# Patient Record
Sex: MALE | Race: BLACK OR AFRICAN AMERICAN | Employment: FULL TIME | URBAN - METROPOLITAN AREA
[De-identification: names, ages, dates, MRNs, and addresses within clinical notes are randomized per-mention and may not be internally consistent; named-entity substitution may affect disease eponyms.]

---

## 2020-07-05 ENCOUNTER — OFFICE VISIT (OUTPATIENT)
Dept: URGENT CARE | Facility: URGENT CARE | Age: 60
End: 2020-07-05
Payer: COMMERCIAL

## 2020-07-05 VITALS
HEART RATE: 66 BPM | SYSTOLIC BLOOD PRESSURE: 134 MMHG | WEIGHT: 310.44 LBS | OXYGEN SATURATION: 96 % | DIASTOLIC BLOOD PRESSURE: 85 MMHG | TEMPERATURE: 98.3 F

## 2020-07-05 DIAGNOSIS — K08.89 TOOTH PAIN: Primary | ICD-10-CM

## 2020-07-05 DIAGNOSIS — Z20.822 EXPOSURE TO COVID-19 VIRUS: ICD-10-CM

## 2020-07-05 PROCEDURE — 99203 OFFICE O/P NEW LOW 30 MIN: CPT | Performed by: FAMILY MEDICINE

## 2020-07-05 RX ORDER — AMOXICILLIN 875 MG
TABLET ORAL
COMMUNITY
Start: 2020-03-30

## 2020-07-05 RX ORDER — AMOXICILLIN 875 MG
875 TABLET ORAL 2 TIMES DAILY
Qty: 20 TABLET | Refills: 0 | Status: SHIPPED | OUTPATIENT
Start: 2020-07-05 | End: 2020-07-15

## 2020-07-05 RX ORDER — IBUPROFEN 800 MG/1
TABLET, FILM COATED ORAL
COMMUNITY
Start: 2020-03-30

## 2020-07-05 RX ORDER — LISINOPRIL AND HYDROCHLOROTHIAZIDE 12.5; 2 MG/1; MG/1
1 TABLET ORAL
COMMUNITY
Start: 2019-07-24 | End: 2020-07-23

## 2020-07-05 RX ORDER — SILDENAFIL 50 MG/1
50 TABLET, FILM COATED ORAL
COMMUNITY
Start: 2019-07-24

## 2020-07-05 NOTE — PROGRESS NOTES
Subjective     Stanley Jones is a 59 year old male who presents to clinic today for the following health issues:    HPI   History of tooth pain, right lower area being swollen.  Patient reports previously was supposed to have some type of surgery however been postponed because of the current acute infection.    He reports his wife was exposed at work for COVID infection 1 of her coworker was tested positive.  Otherwise she has no symptoms.  And he denies any other symptoms.      Reviewed and updated as needed this visit by Provider         Review of Systems   Constitutional, HEENT, cardiovascular, pulmonary, gi and gu systems are negative, except as otherwise noted.      Objective    /85   Pulse 66   Temp 98.3  F (36.8  C)   Wt 140.8 kg (310 lb 7 oz)   SpO2 96%   There is no height or weight on file to calculate BMI.  Physical Exam   GENERAL: healthy, alert and no distress  MS: no gross musculoskeletal defects noted, no edema    Diagnostic Test Results:  Labs reviewed in Epic  No orders of the defined types were placed in this encounter.    Orders Placed This Encounter   Procedures     Asymptomatic COVID-19 Virus (Coronavirus) by PCR           Assessment & Plan       ICD-10-CM    1. Tooth pain  K08.89 amoxicillin (AMOXIL) 875 MG tablet   2. Exposure to COVID-19 virus  Z20.828 Asymptomatic COVID-19 Virus (Coronavirus) by PCR      gargle with water and salt  Follow up with a dentist.    Patient Instructions       Patient Education     Dental Abscess  An abscess is a sac of pus. A dental abscess forms when a tooth or the tissue around it becomes infected with bacteria. The bacteria can enter through a cavity or a crack in a tooth. It can also infect the gum tissue or bone around a tooth. An untreated abscess can cause the loss of the tooth. It can even spread to other parts of the body and become life-threatening.    Symptoms of a dental abscess   Signs of a dental abscess include:    Toothache, often  severe    Tooth pain with hot, cold, or pressure    Pain in the gums, cheek, or jaw    Bad breath or bitter taste in the mouth    Trouble swallowing or opening the mouth    Fever    Swollen or enlarged glands in the neck  Diagnosing a dental abscess  An abscess is diagnosed by looking at your teeth and gums. You will be told if any tests are needed, such as dental X-rays.  Treating a dental abscess  Treatments for a dental abscess may include the following:    Antibiotic medicines. These treat the underlying infection.    Pain relievers. These help you feel more comfortable. Your healthcare provider may prescribe a medicine for you. Or you may use over-the-counter pain relievers, such as acetaminophen or ibuprofen.    Warm saltwater rinses. These can soothe discomfort and help clear away pus.    Root canal surgery.  This may be done if needed to save the tooth. With a root canal, the infected part of the tooth is removed. A special substance is then used to fill the empty space in the tooth.    Draining the abscess. This may be doneif needed. Incisions are made to allow the infected material to drain from the tooth.    Removing the tooth. This is done in cases of severe infection that can t be treated another way.  You may need to be admitted to a hospital if the infection is severe, has spread, or doesn t respond to treatment.     When to call the dentist  Call your dentist right away if you have any of the following:    Fever of 100.4 F (38 C) or higher    Increased pain, redness, drainage, or swelling in the treated area    Swelling of the face or jawbone    Pain that can't be controlled with medicines   Preventing dental abscess  To prevent another abscess in the future, keep your teeth clean and healthy. Brush twice a day and floss at least once daily. See your dentist for regular tooth cleanings. And stay away from sugary foods and drinks that can lead to tooth decay.  Date Last Reviewed: 6/1/2017 2000-2019  [employer]. [address] [place] [address], [place] [address]. All rights reserved. This information is not intended as a substitute for professional medical care. Always follow your healthcare professional's instructions.               No follow-ups on file.    Lisa Kaye MD  [facility]

## 2020-07-05 NOTE — PATIENT INSTRUCTIONS
Patient Education     Dental Abscess  An abscess is a sac of pus. A dental abscess forms when a tooth or the tissue around it becomes infected with bacteria. The bacteria can enter through a cavity or a crack in a tooth. It can also infect the gum tissue or bone around a tooth. An untreated abscess can cause the loss of the tooth. It can even spread to other parts of the body and become life-threatening.    Symptoms of a dental abscess   Signs of a dental abscess include:    Toothache, often severe    Tooth pain with hot, cold, or pressure    Pain in the gums, cheek, or jaw    Bad breath or bitter taste in the mouth    Trouble swallowing or opening the mouth    Fever    Swollen or enlarged glands in the neck  Diagnosing a dental abscess  An abscess is diagnosed by looking at your teeth and gums. You will be told if any tests are needed, such as dental X-rays.  Treating a dental abscess  Treatments for a dental abscess may include the following:    Antibiotic medicines. These treat the underlying infection.    Pain relievers. These help you feel more comfortable. Your healthcare provider may prescribe a medicine for you. Or you may use over-the-counter pain relievers, such as acetaminophen or ibuprofen.    Warm saltwater rinses. These can soothe discomfort and help clear away pus.    Root canal surgery.  This may be done if needed to save the tooth. With a root canal, the infected part of the tooth is removed. A special substance is then used to fill the empty space in the tooth.    Draining the abscess. This may be doneif needed. Incisions are made to allow the infected material to drain from the tooth.    Removing the tooth. This is done in cases of severe infection that can t be treated another way.  You may need to be admitted to a hospital if the infection is severe, has spread, or doesn t respond to treatment.     When to call the dentist  Call your dentist right away if you have any of the  following:    Fever of 100.4 F (38 C) or higher    Increased pain, redness, drainage, or swelling in the treated area    Swelling of the face or jawbone    Pain that can't be controlled with medicines   Preventing dental abscess  To prevent another abscess in the future, keep your teeth clean and healthy. Brush twice a day and floss at least once daily. See your dentist for regular tooth cleanings. And stay away from sugary foods and drinks that can lead to tooth decay.  Date Last Reviewed: 6/1/2017 2000-2019 [employer]. [address] [place] [id number]. All rights reserved. This information is not intended as a substitute for professional medical care. Always follow your healthcare professional's instructions.

## 2020-07-09 DIAGNOSIS — Z20.822 EXPOSURE TO COVID-19 VIRUS: ICD-10-CM

## 2020-07-09 PROCEDURE — U0003 INFECTIOUS AGENT DETECTION BY NUCLEIC ACID (DNA OR RNA); SEVERE ACUTE RESPIRATORY SYNDROME CORONAVIRUS 2 (SARS-COV-2) (CORONAVIRUS DISEASE [COVID-19]), AMPLIFIED PROBE TECHNIQUE, MAKING USE OF HIGH THROUGHPUT TECHNOLOGIES AS DESCRIBED BY CMS-2020-01-R: HCPCS | Performed by: FAMILY MEDICINE

## 2020-07-10 LAB
SARS-COV-2 RNA SPEC QL NAA+PROBE: NOT DETECTED
SPECIMEN SOURCE: NORMAL

## 2021-04-17 ENCOUNTER — HEALTH MAINTENANCE LETTER (OUTPATIENT)
Age: 61
End: 2021-04-17

## 2021-10-02 ENCOUNTER — HEALTH MAINTENANCE LETTER (OUTPATIENT)
Age: 61
End: 2021-10-02

## 2022-05-08 ENCOUNTER — HEALTH MAINTENANCE LETTER (OUTPATIENT)
Age: 62
End: 2022-05-08

## 2023-01-14 ENCOUNTER — HEALTH MAINTENANCE LETTER (OUTPATIENT)
Age: 63
End: 2023-01-14

## 2023-06-02 ENCOUNTER — HEALTH MAINTENANCE LETTER (OUTPATIENT)
Age: 63
End: 2023-06-02

## 2023-12-09 ENCOUNTER — HEALTH MAINTENANCE LETTER (OUTPATIENT)
Age: 63
End: 2023-12-09